# Patient Record
Sex: FEMALE | Race: WHITE | NOT HISPANIC OR LATINO | Employment: OTHER | URBAN - METROPOLITAN AREA
[De-identification: names, ages, dates, MRNs, and addresses within clinical notes are randomized per-mention and may not be internally consistent; named-entity substitution may affect disease eponyms.]

---

## 2023-08-08 ENCOUNTER — LAB REQUISITION (OUTPATIENT)
Dept: LAB | Facility: HOSPITAL | Age: 28
DRG: 857 | End: 2023-08-08
Payer: COMMERCIAL

## 2023-08-08 ENCOUNTER — HOSPITAL ENCOUNTER (INPATIENT)
Facility: HOSPITAL | Age: 28
LOS: 2 days | Discharge: HOME | DRG: 857 | End: 2023-08-10
Attending: SURGERY | Admitting: SURGERY
Payer: COMMERCIAL

## 2023-08-08 ENCOUNTER — OFFICE VISIT (OUTPATIENT)
Dept: WOUND CARE | Facility: HOSPITAL | Age: 28
DRG: 857 | End: 2023-08-08
Payer: COMMERCIAL

## 2023-08-08 DIAGNOSIS — S31.109A OPEN WOUND OF ABDOMEN, INITIAL ENCOUNTER: ICD-10-CM

## 2023-08-08 DIAGNOSIS — T81.31XA DISRUPTION OF EXTERNAL OPERATION (SURGICAL) WOUND, NOT ELSEWHERE CLASSIFIED, INITIAL ENCOUNTER: ICD-10-CM

## 2023-08-08 DIAGNOSIS — L03.311 CELLULITIS OF ABDOMINAL WALL: Primary | ICD-10-CM

## 2023-08-08 LAB
ANION GAP SERPL CALCULATED.3IONS-SCNC: 12.9 MMOL/L (ref 5–15)
BUN SERPL-MCNC: 8 MG/DL (ref 6–20)
BUN/CREAT SERPL: 13.8 (ref 7–25)
CALCIUM SPEC-SCNC: 8.9 MG/DL (ref 8.6–10.5)
CHLORIDE SERPL-SCNC: 101 MMOL/L (ref 98–107)
CO2 SERPL-SCNC: 25.1 MMOL/L (ref 22–29)
CREAT SERPL-MCNC: 0.58 MG/DL (ref 0.57–1)
D-LACTATE SERPL-SCNC: 0.7 MMOL/L (ref 0.5–2)
DEPRECATED RDW RBC AUTO: 37.5 FL (ref 37–54)
EGFRCR SERPLBLD CKD-EPI 2021: 125.8 ML/MIN/1.73
ERYTHROCYTE [DISTWIDTH] IN BLOOD BY AUTOMATED COUNT: 12.5 % (ref 12.3–15.4)
GLUCOSE SERPL-MCNC: 83 MG/DL (ref 65–99)
HCT VFR BLD AUTO: 27.7 % (ref 34–46.6)
HGB BLD-MCNC: 8.9 G/DL (ref 12–15.9)
MCH RBC QN AUTO: 26.9 PG (ref 26.6–33)
MCHC RBC AUTO-ENTMCNC: 32.1 G/DL (ref 31.5–35.7)
MCV RBC AUTO: 83.7 FL (ref 79–97)
PLATELET # BLD AUTO: 371 10*3/MM3 (ref 140–450)
PMV BLD AUTO: 9.2 FL (ref 6–12)
POTASSIUM SERPL-SCNC: 3.7 MMOL/L (ref 3.5–5.2)
RBC # BLD AUTO: 3.31 10*6/MM3 (ref 3.77–5.28)
SODIUM SERPL-SCNC: 139 MMOL/L (ref 136–145)
WBC NRBC COR # BLD: 6.42 10*3/MM3 (ref 3.4–10.8)

## 2023-08-08 PROCEDURE — 80048 BASIC METABOLIC PNL TOTAL CA: CPT | Performed by: SURGERY

## 2023-08-08 PROCEDURE — 87205 SMEAR GRAM STAIN: CPT | Performed by: SURGERY

## 2023-08-08 PROCEDURE — 0JB80ZZ EXCISION OF ABDOMEN SUBCUTANEOUS TISSUE AND FASCIA, OPEN APPROACH: ICD-10-PCS | Performed by: SURGERY

## 2023-08-08 PROCEDURE — 87077 CULTURE AEROBIC IDENTIFY: CPT | Performed by: SURGERY

## 2023-08-08 PROCEDURE — 85027 COMPLETE CBC AUTOMATED: CPT | Performed by: SURGERY

## 2023-08-08 PROCEDURE — 83605 ASSAY OF LACTIC ACID: CPT | Performed by: SURGERY

## 2023-08-08 PROCEDURE — 87186 SC STD MICRODIL/AGAR DIL: CPT | Performed by: SURGERY

## 2023-08-08 PROCEDURE — 87075 CULTR BACTERIA EXCEPT BLOOD: CPT | Performed by: SURGERY

## 2023-08-08 PROCEDURE — 87070 CULTURE OTHR SPECIMN AEROBIC: CPT | Performed by: SURGERY

## 2023-08-08 PROCEDURE — 25010000002 AMPICILLIN-SULBACTAM PER 1.5 G: Performed by: SURGERY

## 2023-08-08 PROCEDURE — 87076 CULTURE ANAEROBE IDENT EACH: CPT | Performed by: SURGERY

## 2023-08-08 PROCEDURE — G0463 HOSPITAL OUTPT CLINIC VISIT: HCPCS

## 2023-08-08 RX ORDER — ONDANSETRON 4 MG/1
8 TABLET, FILM COATED ORAL EVERY 8 HOURS PRN
Status: DISCONTINUED | OUTPATIENT
Start: 2023-08-08 | End: 2023-08-10 | Stop reason: HOSPADM

## 2023-08-08 RX ORDER — VILAZODONE HYDROCHLORIDE 10 MG/1
10 TABLET ORAL DAILY
Status: DISCONTINUED | OUTPATIENT
Start: 2023-08-08 | End: 2023-08-10 | Stop reason: HOSPADM

## 2023-08-08 RX ORDER — ACETAMINOPHEN 325 MG/1
650 TABLET ORAL EVERY 6 HOURS PRN
Status: DISCONTINUED | OUTPATIENT
Start: 2023-08-08 | End: 2023-08-10 | Stop reason: HOSPADM

## 2023-08-08 RX ORDER — HYDROCHLOROTHIAZIDE 25 MG/1
25 TABLET ORAL DAILY
Status: DISCONTINUED | OUTPATIENT
Start: 2023-08-08 | End: 2023-08-10 | Stop reason: HOSPADM

## 2023-08-08 RX ORDER — CLONIDINE HYDROCHLORIDE 0.1 MG/1
0.1 TABLET ORAL NIGHTLY
Status: DISCONTINUED | OUTPATIENT
Start: 2023-08-08 | End: 2023-08-10 | Stop reason: HOSPADM

## 2023-08-08 RX ORDER — HYDROCODONE BITARTRATE AND ACETAMINOPHEN 5; 325 MG/1; MG/1
2 TABLET ORAL EVERY 6 HOURS PRN
Status: DISCONTINUED | OUTPATIENT
Start: 2023-08-08 | End: 2023-08-08

## 2023-08-08 RX ORDER — HYDROCODONE BITARTRATE AND ACETAMINOPHEN 7.5; 325 MG/1; MG/1
2 TABLET ORAL EVERY 4 HOURS PRN
Status: DISCONTINUED | OUTPATIENT
Start: 2023-08-08 | End: 2023-08-08

## 2023-08-08 RX ORDER — DIPHENHYDRAMINE HCL 25 MG
25 CAPSULE ORAL EVERY 6 HOURS PRN
Status: DISCONTINUED | OUTPATIENT
Start: 2023-08-08 | End: 2023-08-10 | Stop reason: HOSPADM

## 2023-08-08 RX ORDER — CLONIDINE HYDROCHLORIDE 0.1 MG/1
0.1 TABLET ORAL NIGHTLY
COMMUNITY

## 2023-08-08 RX ORDER — HYDROXYZINE 50 MG/1
100 TABLET, FILM COATED ORAL NIGHTLY
Status: DISCONTINUED | OUTPATIENT
Start: 2023-08-08 | End: 2023-08-10 | Stop reason: HOSPADM

## 2023-08-08 RX ORDER — HYDROCHLOROTHIAZIDE 12.5 MG/1
25 TABLET ORAL DAILY
COMMUNITY

## 2023-08-08 RX ORDER — HYDROCODONE BITARTRATE AND ACETAMINOPHEN 7.5; 325 MG/1; MG/1
2 TABLET ORAL EVERY 4 HOURS PRN
Status: DISCONTINUED | OUTPATIENT
Start: 2023-08-08 | End: 2023-08-10 | Stop reason: HOSPADM

## 2023-08-08 RX ORDER — DOCUSATE SODIUM 100 MG/1
100 CAPSULE, LIQUID FILLED ORAL 2 TIMES DAILY
Status: DISCONTINUED | OUTPATIENT
Start: 2023-08-08 | End: 2023-08-10 | Stop reason: HOSPADM

## 2023-08-08 RX ORDER — VILAZODONE HYDROCHLORIDE 10 MG/1
10 TABLET ORAL DAILY
COMMUNITY

## 2023-08-08 RX ORDER — HYDROCODONE BITARTRATE AND ACETAMINOPHEN 7.5; 325 MG/1; MG/1
1 TABLET ORAL EVERY 4 HOURS PRN
Status: DISCONTINUED | OUTPATIENT
Start: 2023-08-08 | End: 2023-08-08

## 2023-08-08 RX ORDER — HYDROXYZINE 50 MG/1
100 TABLET, FILM COATED ORAL NIGHTLY
COMMUNITY

## 2023-08-08 RX ORDER — HYDROCODONE BITARTRATE AND ACETAMINOPHEN 7.5; 325 MG/1; MG/1
1 TABLET ORAL EVERY 4 HOURS PRN
Status: DISCONTINUED | OUTPATIENT
Start: 2023-08-08 | End: 2023-08-10 | Stop reason: HOSPADM

## 2023-08-08 RX ADMIN — AMPICILLIN SODIUM AND SULBACTAM SODIUM 3 G: 2; 1 INJECTION, POWDER, FOR SOLUTION INTRAMUSCULAR; INTRAVENOUS at 21:10

## 2023-08-08 RX ADMIN — VILAZODONE HYDROCHLORIDE 10 MG: 10 TABLET ORAL at 14:10

## 2023-08-08 RX ADMIN — DOCUSATE SODIUM 100 MG: 100 CAPSULE, LIQUID FILLED ORAL at 21:17

## 2023-08-08 RX ADMIN — HYDROXYZINE HYDROCHLORIDE 100 MG: 50 TABLET ORAL at 21:10

## 2023-08-08 RX ADMIN — CLONIDINE HYDROCHLORIDE 0.1 MG: 0.1 TABLET ORAL at 21:10

## 2023-08-08 RX ADMIN — HYDROCODONE BITARTRATE AND ACETAMINOPHEN 2 TABLET: 5; 325 TABLET ORAL at 18:05

## 2023-08-08 RX ADMIN — HYDROCODONE BITARTRATE AND ACETAMINOPHEN 2 TABLET: 5; 325 TABLET ORAL at 12:02

## 2023-08-08 RX ADMIN — AMPICILLIN SODIUM AND SULBACTAM SODIUM 3 G: 2; 1 INJECTION, POWDER, FOR SOLUTION INTRAMUSCULAR; INTRAVENOUS at 14:09

## 2023-08-08 RX ADMIN — HYDROCODONE BITARTRATE AND ACETAMINOPHEN 2 TABLET: 7.5; 325 TABLET ORAL at 22:11

## 2023-08-08 RX ADMIN — METOPROLOL TARTRATE 12.5 MG: 25 TABLET, FILM COATED ORAL at 14:09

## 2023-08-08 RX ADMIN — HYDROCHLOROTHIAZIDE 25 MG: 25 TABLET ORAL at 14:09

## 2023-08-08 NOTE — NURSING NOTE
08/08/23 1324   Wound 08/08/23 1056 abdomen Incision   Placement Date/Time: 08/08/23 1056   Present on Hospital Admission: Yes  Location: abdomen  Primary Wound Type: Incision   Wound Image    Base non-granulating;necrotic;slough;yellow;pink  (black/ gray, with tunnels in the areas of necrosis)   Black (%), Wound Tissue Color 85   Red (%), Wound Tissue Color 15   Periwound intact;redness   Periwound Temperature warm   Wound Length (cm) 10.5 cm   Wound Width (cm) 6 cm   Wound Depth (cm) 2 cm   Wound Surface Area (cm^2) 63 cm^2   Wound Volume (cm^3) 126 cm^3   Tunneling [Depth (cm)/Location] 2 tunnels in lower portion of wound, approximately 3cms each   Undermining [Depth (cm)/Location] superior wound connects with mid abdominal wound underneath a skin bridge along incision   Drainage Characteristics/Odor brown;yellow;malodorous   Drainage Amount small   Care, Wound cleansed with;sterile normal saline;negative pressure wound therapy   Dressing Care dressing changed   Periwound Care barrier film applied  (edges were prepared and bordered with hydrocolloid by MD at the [facility] prior to transfering to in-patient care)   NPWT (Negative Pressure Wound Therapy) 08/08/23 1320 abdomen   Placement Date/Time: 08/08/23 1320   Location: abdomen   Therapy Setting continuous therapy   Dressing foam, black;transparent dressing   Pressure Setting 125 mmHg   Sponges Inserted 1     CWON note:  Pt seen for wound VAC placement. Wound assessment as stated above. I called Dr Arzate to clarify the orders and how he wanted a wound VAC dressing placed.  Dressing was placed precisely as he ordered, as an incisional VAC.  Pt tolerated dressing change well. Will plan to change dressing again on Thursday or Friday depending on Dr Arzate and pending home VAC approval for D/C.

## 2023-08-08 NOTE — H&P
Chief Complaint  Information obtained from Patient  28 year old with abdominal wound.    History of Present Illness (HPI)  28 year old female with history of massive weight loss in past, s/p abdominal lipectomy with open wound. The patient has an open wound over the umbilical region after previous lipectomy. The patient is performing Dakins dressing care to the open wound, she was on Clindamycin recently and as finished this. The patient had a temperature last night to 101.5, she is taking Tylenol to help with this. She has weeping from the central aspect of the op site. She has 2 LUZMA drains intact and does complain of pain, she is on Norco pain medication 5/325 mg daily.    Patient History  Information obtained from Patient.    Allergies  Betadine      Social History  Never smoker, Marital Status - , Alcohol Use - Never, Drug Use - No History, Caffeine Use - Daily - tea, soda.    Medical History  Cardiovascular  Patient has history of Hypertension    Hospitalization/Surgery History - tummy tack. - tarsal foot surgery. - carpal tunnel right hand. - 3 c section. - hysterectomy.    Review of Systems (ROS)  Constitutional Symptoms (General Health)  Complains or has symptoms of Fever, Chills.  Eyes  Complains or has symptoms of Glasses / Contacts.  Denies complaints or symptoms of Dry Eyes, Vision Changes.  Ear/Nose/Mouth/Throat  Denies complaints or symptoms of Chronic sinus problems or rhinitis.  Respiratory  Denies complaints or symptoms of Chronic or frequent coughs, Shortness of Breath.  Cardiovascular  Denies complaints or symptoms of Chest pain.  Gastrointestinal  Denies complaints or symptoms of Frequent diarrhea, Nausea, Vomiting.  Endocrine  Denies complaints or symptoms of Heat/cold intolerance.  Genitourinary  Denies complaints or symptoms of Frequent urination.  Integumentary (Skin)  Complains or has symptoms of Wounds.  Musculoskeletal  Denies complaints or symptoms of Muscle Pain, Muscle  Weakness.  Neurologic  Denies complaints or symptoms of Numbness/parasthesias.  Psychiatric  Denies complaints or symptoms of Claustrophobia, Suicidal.      Medications  clonidine HCl 0.1 mg tablet oral tablet oral  metoprolol tartrate 25 mg tablet oral 12.5 tablet oral  Viibryd 10 mg tablet oral tablet oral  hydrochlorothiazide 25 mg tablet oral tablet oral      Objective    Constitutional  Vitals Time Taken: 8:18 AM, Height: 61 in, Source: Stated, Weight: 148 lbs, Source: Measured, BMI: 28, Temperature: 98 øF, Pulse: 92 bpm, Respiratory Rate: 16 breaths/min, Blood Pressure: 109/73 mmHg.      Ears, Nose, Mouth, and Throat  normal hearing at 5 feet forced whisper.    Respiratory  normal breathing without difficulty. clear to auscultation bilaterally.    Cardiovascular  regular rate and rhythm, normal S1, S2,. no clubbing, cyanosis, edema,  Gastrointestinal (GI)  soft, non-distended, +BS, tender over the umbilicil area.    Neurological  cranial nerves 2-12 intact.    Psychiatric  this patient is able to make decisions and demonstrates good insight into disease process. Alert and Oriented x 4. good recall to recent and remote information.      Integumentary (Hair, Skin)  Abdominal area: slough and fibrin over the umbilical region, with dehiscence of the umbilicus inferior border. T Zone has region of early separation and dehiscence at the base.. For complete descriptions of all wounds, see following section on detailed wound assessments..    Wound #1 status is Open. The date acquired was: 8/8/2023. The wound is currently classified as a Full Thickness Without Exposed Support Structures wound with etiology of Open Surgical Wound and is located on the Abdomen - midline. The wound measures 2.8cm length x 2.4cm width x 2.5cm depth; 5.278cm^2 area and 13.195cm^3 volume. There is Fat Layer (Subcutaneous Tissue) exposed. There is no tunneling or undermining noted. There is a medium amount of serosanguineous drainage noted.  Foul odor after cleansing was noted. The wound margin is distinct with the outline attached to the wound base. There is no granulation within the wound bed. There is a large (%) amount of necrotic tissue within the wound bed including Adherent Slough. The periwound skin appearance had no abnormalities noted for texture. The periwound skin appearance had no abnormalities noted for moisture. The periwound skin appearance exhibited: Erythema. The surrounding wound skin color is noted with erythema which is circumferential. Periwound temperature was noted as No Abnormality. The periwound has tenderness on palpation.    Wound #2 status is Open. The date acquired was: 8/8/2023. The wound is currently classified as a Full Thickness Without Exposed Support Structures wound with etiology of Open Surgical Wound and is located on the Distal Abdomen - midline. The wound measures 3.1cm length x 27cm width x 0.3cm depth; 65.738cm^2 area and 19.721cm^3 volume. There is Fat Layer (Subcutaneous Tissue) exposed. There is no tunneling or undermining noted. There is a medium amount of serosanguineous drainage noted. Foul odor after cleansing was noted. The wound margin is distinct with the outline attached to the wound base. There is no granulation within the wound bed. There is a large (%) amount of necrotic tissue within the wound bed including Adherent Slough. The periwound skin appearance had no abnormalities noted for texture. The periwound skin appearance had no abnormalities noted for moisture. The periwound skin appearance exhibited: Erythema. The surrounding wound skin color is noted with erythema which is circumferential. Periwound temperature was noted as No Abnormality. The periwound has tenderness on palpation.        I understand as a provider that the creation of this Progress Note is my responsibility. Based on my medical observations while examining this patient, I certify that these notes are my notes  defining the medical decision making process I utilized during this visit including the etiology, grade or staging of each of the wounds.    Submitted: 8/8/2023 11:03    By: Rustam Arzate    Assessment      The patient has 2 open wounds over the lower abdominal area and the umbilicus with cellulitis, recent febrile episode to 101.5 degrees .  Today the umbilical area will be debrided to prepare the region for a wound vacuum which will permit healing over the lower abdominal area.  Will admit for cellulitis and begin IV Unasyn , today cultures were obtained from the umbilical area.  Dakin's moist gauze was placed over the lower abdominal area until a vacuum dressing can be placed.     Procedures    Wound #1  Pre-procedure diagnosis of Wound #1 is an Open Surgical Wound located on the Abdomen - midline . There was a Excisional Skin/Subcutaneous Tissue Debridement with a total area of 6.72 sq cm performed by Rustam Arzate MD. With the following instrument(s): Curette to remove Viable and Non-Viable tissue/material. Material removed includes Subcutaneous Tissue and Slough and. A time out was conducted at 09:49, prior to the start of the procedure. There was no bleeding. The procedure was tolerated well. Post Debridement Measurements: 2.8cm length x 2.4cm width x 2.5cm depth; 13.195cm^3 volume.  Character of Wound/Ulcer Post Debridement is improved.  Post procedure Diagnosis Wound #1: Same as Pre-Procedure      Wound #2  Pre-procedure diagnosis of Wound #2 is an Open Surgical Wound located on the Distal Abdomen - midline . There was a Excisional Skin/Subcutaneous Tissue Debridement with a total area of 83.7 sq cm performed by Rustam Arzate MD. With the following instrument(s): Curette to remove Viable and Non-Viable tissue/material. Material removed includes Subcutaneous Tissue and Slough and. A time out was conducted at 09:49, prior to the start of the procedure. There was no bleeding. The procedure was tolerated  well. Post Debridement Measurements: 3.1cm length x 27cm width x 0.3cm depth; 19.721cm^3 volume.  Character of Wound/Ulcer Post Debridement is improved.  Post procedure Diagnosis Wound #2: Same as Pre-Procedure          Electronic Signature(s)  Signed: 8/8/2023 11:18:30 AM By: Rustam Arzate MD  Previous Signature: 8/8/2023 11:03:59 AM Version By: Rustam Arzate MD

## 2023-08-09 PROCEDURE — 63710000001 ONDANSETRON PER 8 MG: Performed by: SURGERY

## 2023-08-09 PROCEDURE — 25010000002 AMPICILLIN-SULBACTAM PER 1.5 G: Performed by: SURGERY

## 2023-08-09 RX ADMIN — VILAZODONE HYDROCHLORIDE 10 MG: 10 TABLET ORAL at 08:39

## 2023-08-09 RX ADMIN — DOCUSATE SODIUM 100 MG: 100 CAPSULE, LIQUID FILLED ORAL at 08:39

## 2023-08-09 RX ADMIN — AMPICILLIN SODIUM AND SULBACTAM SODIUM 3 G: 2; 1 INJECTION, POWDER, FOR SOLUTION INTRAMUSCULAR; INTRAVENOUS at 08:39

## 2023-08-09 RX ADMIN — AMPICILLIN SODIUM AND SULBACTAM SODIUM 3 G: 2; 1 INJECTION, POWDER, FOR SOLUTION INTRAMUSCULAR; INTRAVENOUS at 01:59

## 2023-08-09 RX ADMIN — HYDROCODONE BITARTRATE AND ACETAMINOPHEN 1 TABLET: 7.5; 325 TABLET ORAL at 10:00

## 2023-08-09 RX ADMIN — METOPROLOL TARTRATE 12.5 MG: 25 TABLET, FILM COATED ORAL at 08:39

## 2023-08-09 RX ADMIN — ONDANSETRON HYDROCHLORIDE 8 MG: 4 TABLET, FILM COATED ORAL at 10:00

## 2023-08-09 RX ADMIN — HYDROCODONE BITARTRATE AND ACETAMINOPHEN 2 TABLET: 7.5; 325 TABLET ORAL at 18:37

## 2023-08-09 RX ADMIN — HYDROCODONE BITARTRATE AND ACETAMINOPHEN 2 TABLET: 7.5; 325 TABLET ORAL at 05:58

## 2023-08-09 RX ADMIN — CLONIDINE HYDROCHLORIDE 0.1 MG: 0.1 TABLET ORAL at 20:06

## 2023-08-09 RX ADMIN — AMPICILLIN SODIUM AND SULBACTAM SODIUM 3 G: 2; 1 INJECTION, POWDER, FOR SOLUTION INTRAMUSCULAR; INTRAVENOUS at 13:39

## 2023-08-09 RX ADMIN — HYDROCHLOROTHIAZIDE 25 MG: 25 TABLET ORAL at 08:39

## 2023-08-09 RX ADMIN — HYDROXYZINE HYDROCHLORIDE 100 MG: 50 TABLET ORAL at 20:06

## 2023-08-09 RX ADMIN — AMPICILLIN SODIUM AND SULBACTAM SODIUM 3 G: 2; 1 INJECTION, POWDER, FOR SOLUTION INTRAMUSCULAR; INTRAVENOUS at 20:06

## 2023-08-09 RX ADMIN — DOCUSATE SODIUM 100 MG: 100 CAPSULE, LIQUID FILLED ORAL at 20:06

## 2023-08-09 RX ADMIN — HYDROCODONE BITARTRATE AND ACETAMINOPHEN 2 TABLET: 7.5; 325 TABLET ORAL at 13:39

## 2023-08-09 NOTE — CASE MANAGEMENT/SOCIAL WORK
Discharge Planning Assessment  Psychiatric     Patient Name: Palmira Ruelas  MRN: [id number]  Today's Date: 8/9/2023    Admit Date: 8/8/2023    Plan: Home with [facility] wound vac and HH- referral pending   Discharge Needs Assessment       Row Name 08/09/23 1309       Living Environment    People in Home spouse    Name(s) of People in Home Spouse and children    Current Living Arrangements home    Potentially Unsafe Housing Conditions none    Primary Care Provided by self    Provides Primary Care For no one    Family Caregiver if Needed spouse    Family Caregiver Names Terrell Cullen [phone]    Quality of Family Relationships helpful;involved;supportive    Able to Return to Prior Arrangements yes       Resource/Environmental Concerns    Resource/Environmental Concerns none       Transition Planning    Patient/Family Anticipates Transition to home with family    Transportation Anticipated family or friend will provide       Discharge Needs Assessment    Equipment Currently Used at Home none    Concerns to be Addressed discharge planning                   Discharge Plan       Row Name 08/09/23 8653       Plan    Plan Home with [facility] wound vac and HH- referral pending    Patient/Family in Agreement with Plan yes    Plan Comments CCP spoke with patient and patient's spouse, Terrell, at bedside; CCP role explained, face sheet verified, and discharge plan discussed. Patient resides with spouse and three children in one-level home with three-four steps to enter. Patient denies use of any DME and reports being independent with ADLs. Confirms Meds to Beds. Patient states she does have PCP and sees Lakeshia Mcmahon through [facility]. Denies any HH and SNF history. Patient needing wound vac and HH. Agreeable to referrals sent to [facility] (pending). Patient states family will transport home. CCP to follow. Jose ROBLERO LCSW                  Continued Care and Services - Admitted Since 8/8/2023        Durable Medical Equipment       Service Provider Request Status Selected Services Address Phone Fax Patient Preferred    [facility] Accepted N/A [id number] [id number] [phone] --              Home Medical Care       Service Provider Request Status Selected Services Address Phone Fax Patient Preferred    [facility] Pending - Request Sent N/A [address] [facility] [id number] [phone] --                  Expected Discharge Date and Time       Expected Discharge Date Expected Discharge Time    Aug 11, 2023            Demographic Summary       Row Name 08/09/23 1308       General Information    Admission Type inpatient    Arrived From home    Reason for Consult discharge planning    Preferred Language English                   Functional Status       Row Name 08/09/23 1308       Functional Status    Usual Activity Tolerance good    Current Activity Tolerance good       Functional Status, IADL    Medications independent    Meal Preparation independent    Housekeeping independent    Laundry independent    Shopping independent       Mental Status    General Appearance WDL WDL                   Psychosocial    No documentation.                  Abuse/Neglect    No documentation.                  Legal    No documentation.                  Substance Abuse    No documentation.                  Patient Forms    No documentation.                     Jose ROBLERO LCSW

## 2023-08-09 NOTE — PLAN OF CARE
Goal Outcome Evaluation:           Progress: no change  Outcome Evaluation: VSS, c/o pain PRN give, wound vac in place, + ambulation, + BM, IV abx, LUZMA drains in place

## 2023-08-09 NOTE — PLAN OF CARE
Goal Outcome Evaluation:  Plan of Care Reviewed With: patient        Progress: no change  Outcome Evaluation: Patient is currently undergoing treatment for cellulitis of the abdominal wall following a recent abdominal lipectomy. Wound vac in place to abdomen and CDI. LUZMA drains to the right and left lower abdomen with dark red output. Wound cultures pending. IV unasym infusions continued. Slept well tonight.

## 2023-08-09 NOTE — DISCHARGE PLACEMENT REQUEST
"Palmira Moreno (28 y.o. Female)       Date of Birth   1995    Social Security Number       Address   [address] [address]    Home Phone   [phone]    MRN   [id number]       Restoration   Unknown    Marital Status                               Admission Date   8/8/23    Admission Type   Urgent    Admitting Provider   Silas Arzate MD    Attending Provider   Silas Arzate MD    Department, Room/Bed   [address], [address]/1       Discharge Date       Discharge Disposition       Discharge Destination                                 Attending Provider: Silas Arzate MD    Allergies: Betadine [Povidone Iodine], Latex    Isolation: None   Infection: None   Code Status: CPR    Ht: 154.9 cm (61\")   Wt: 70 kg (154 lb 5.2 oz)    Admission Cmt: None   Principal Problem: Cellulitis of abdominal wall [L03.311]                   Active Insurance as of 8/8/2023       Primary Coverage       Payor Plan Insurance Group Employer/Plan Group    [employer] [facility] [id number]       Payor Plan Address Payor Plan Phone Number Payor Plan Fax Number Effective Dates    [address] [phone]  10/21/2020 - None Entered    [place] [address]         Subscriber Name Subscriber Birth Date Member ID       NICOLASA MORENO 1995 [id number]                     Emergency Contacts        (Rel.) Home Phone Work Phone Mobile Phone    NICOLASA Moreno (Spouse) [phone] -- [phone]                "

## 2023-08-09 NOTE — PAYOR COMM NOTE
"Palmira Moreno (28 y.o. Female)          INPATIENT REQUEST FOR MEMBER [id number]    CONTACT PHOEBE ROMANO  P# [phone]  F# [phone]         Date of Birth   1995    Social Security Number       Address   [address] [place] [address]    Home Phone   [phone]    MRN   [id number]       Mu-ism   Unknown    Marital Status                               Admission Date   8/8/23    Admission Type   Urgent    Admitting Provider   Silas Arzate MD    Attending Provider   Silas Arzate MD    Department, Room/Bed   [address], E467/1       Discharge Date       Discharge Disposition       Discharge Destination                                 Attending Provider: Silas Arzate MD    Allergies: Betadine [Povidone Iodine], Latex    Isolation: None   Infection: None   Code Status: CPR    Ht: 154.9 cm (61\")   Wt: 70 kg (154 lb 5.2 oz)    Admission Cmt: None   Principal Problem: Cellulitis of abdominal wall [L03.311]                   Active Insurance as of 8/8/2023       Primary Coverage       Payor Plan Insurance Group Employer/Plan Group    [employer] BLUE CROSS [employer] BLUE CROSS BLUE [employer] PPO [id number]       Payor Plan Address Payor Plan Phone Number Payor Plan Fax Number Effective Dates    [address] [phone]  10/21/2020 - None Entered    [place] [address]         Subscriber Name Subscriber Birth Date Member ID       NICOLASA MORENO 1995 [id number]                     Emergency Contacts        (Rel.) Home Phone Work Phone Mobile Phone    NICOLASA Moreno (Spouse) [phone] -- [phone]                 History & Physical        Silas Arzate MD at 08/08/23 1117          Chief Complaint  Information obtained from Patient  28 year old with abdominal wound.    History of Present Illness (HPI)  28 year old female with history of massive weight loss in past, s/p abdominal lipectomy with open wound. The patient has " an open wound over the umbilical region after previous lipectomy. The patient is performing Dakins dressing care to the open wound, she was on Clindamycin recently and as finished this. The patient had a temperature last night to 101.5, she is taking Tylenol to help with this. She has weeping from the central aspect of the op site. She has 2 LUZMA drains intact and does complain of pain, she is on Norco pain medication 5/325 mg daily.    Patient History  Information obtained from Patient.    Allergies  Betadine      Social History  Never smoker, Marital Status - , Alcohol Use - Never, Drug Use - No History, Caffeine Use - Daily - tea, soda.    Medical History  Cardiovascular  Patient has history of Hypertension    Hospitalization/Surgery History - tummy tack. - tarsal foot surgery. - carpal tunnel right hand. - 3 c section. - hysterectomy.    Review of Systems (ROS)  Constitutional Symptoms (General Health)  Complains or has symptoms of Fever, Chills.  Eyes  Complains or has symptoms of Glasses / Contacts.  Denies complaints or symptoms of Dry Eyes, Vision Changes.  Ear/Nose/Mouth/Throat  Denies complaints or symptoms of Chronic sinus problems or rhinitis.  Respiratory  Denies complaints or symptoms of Chronic or frequent coughs, Shortness of Breath.  Cardiovascular  Denies complaints or symptoms of Chest pain.  Gastrointestinal  Denies complaints or symptoms of Frequent diarrhea, Nausea, Vomiting.  Endocrine  Denies complaints or symptoms of Heat/cold intolerance.  Genitourinary  Denies complaints or symptoms of Frequent urination.  Integumentary (Skin)  Complains or has symptoms of Wounds.  Musculoskeletal  Denies complaints or symptoms of Muscle Pain, Muscle Weakness.  Neurologic  Denies complaints or symptoms of Numbness/parasthesias.  Psychiatric  Denies complaints or symptoms of Claustrophobia, Suicidal.      Medications  clonidine HCl 0.1 mg tablet oral tablet oral  metoprolol tartrate 25 mg tablet oral  12.5 tablet oral  Viibryd 10 mg tablet oral tablet oral  hydrochlorothiazide 25 mg tablet oral tablet oral      Objective    Constitutional  Vitals Time Taken: 8:18 AM, Height: 61 in, Source: Stated, Weight: 148 lbs, Source: Measured, BMI: 28, Temperature: 98 øF, Pulse: 92 bpm, Respiratory Rate: 16 breaths/min, Blood Pressure: 109/73 mmHg.      Ears, Nose, Mouth, and Throat  normal hearing at 5 feet forced whisper.    Respiratory  normal breathing without difficulty. clear to auscultation bilaterally.    Cardiovascular  regular rate and rhythm, normal S1, S2,. no clubbing, cyanosis, edema,  Gastrointestinal (GI)  soft, non-distended, +BS, tender over the umbilicil area.    Neurological  cranial nerves 2-12 intact.    Psychiatric  this patient is able to make decisions and demonstrates good insight into disease process. Alert and Oriented x 4. good recall to recent and remote information.      Integumentary (Hair, Skin)  Abdominal area: slough and fibrin over the umbilical region, with dehiscence of the umbilicus inferior border. T Zone has region of early separation and dehiscence at the base.. For complete descriptions of all wounds, see following section on detailed wound assessments..    Wound #1 status is Open. The date acquired was: 8/8/2023. The wound is currently classified as a Full Thickness Without Exposed Support Structures wound with etiology of Open Surgical Wound and is located on the Abdomen - midline. The wound measures 2.8cm length x 2.4cm width x 2.5cm depth; 5.278cm^2 area and 13.195cm^3 volume. There is Fat Layer (Subcutaneous Tissue) exposed. There is no tunneling or undermining noted. There is a medium amount of serosanguineous drainage noted. Foul odor after cleansing was noted. The wound margin is distinct with the outline attached to the wound base. There is no granulation within the wound bed. There is a large (%) amount of necrotic tissue within the wound bed including Adherent  Slough. The periwound skin appearance had no abnormalities noted for texture. The periwound skin appearance had no abnormalities noted for moisture. The periwound skin appearance exhibited: Erythema. The surrounding wound skin color is noted with erythema which is circumferential. Periwound temperature was noted as No Abnormality. The periwound has tenderness on palpation.    Wound #2 status is Open. The date acquired was: 8/8/2023. The wound is currently classified as a Full Thickness Without Exposed Support Structures wound with etiology of Open Surgical Wound and is located on the Distal Abdomen - midline. The wound measures 3.1cm length x 27cm width x 0.3cm depth; 65.738cm^2 area and 19.721cm^3 volume. There is Fat Layer (Subcutaneous Tissue) exposed. There is no tunneling or undermining noted. There is a medium amount of serosanguineous drainage noted. Foul odor after cleansing was noted. The wound margin is distinct with the outline attached to the wound base. There is no granulation within the wound bed. There is a large (%) amount of necrotic tissue within the wound bed including Adherent Slough. The periwound skin appearance had no abnormalities noted for texture. The periwound skin appearance had no abnormalities noted for moisture. The periwound skin appearance exhibited: Erythema. The surrounding wound skin color is noted with erythema which is circumferential. Periwound temperature was noted as No Abnormality. The periwound has tenderness on palpation.        I understand as a provider that the creation of this Progress Note is my responsibility. Based on my medical observations while examining this patient, I certify that these notes are my notes defining the medical decision making process I utilized during this visit including the etiology, grade or staging of each of the wounds.    Submitted: 8/8/2023 11:03    By: Rustam Arzate    Assessment      The patient has 2 open wounds over the lower  abdominal area and the umbilicus with cellulitis, recent febrile episode to 101.5 degrees .  Today the umbilical area will be debrided to prepare the region for a wound vacuum which will permit healing over the lower abdominal area.  Will admit for cellulitis and begin IV Unasyn , today cultures were obtained from the umbilical area.  Dakin's moist gauze was placed over the lower abdominal area until a vacuum dressing can be placed.     Procedures    Wound #1  Pre-procedure diagnosis of Wound #1 is an Open Surgical Wound located on the Abdomen - midline . There was a Excisional Skin/Subcutaneous Tissue Debridement with a total area of 6.72 sq cm performed by Rustam Arzate MD. With the following instrument(s): Curette to remove Viable and Non-Viable tissue/material. Material removed includes Subcutaneous Tissue and Slough and. A time out was conducted at 09:49, prior to the start of the procedure. There was no bleeding. The procedure was tolerated well. Post Debridement Measurements: 2.8cm length x 2.4cm width x 2.5cm depth; 13.195cm^3 volume.  Character of Wound/Ulcer Post Debridement is improved.  Post procedure Diagnosis Wound #1: Same as Pre-Procedure      Wound #2  Pre-procedure diagnosis of Wound #2 is an Open Surgical Wound located on the Distal Abdomen - midline . There was a Excisional Skin/Subcutaneous Tissue Debridement with a total area of 83.7 sq cm performed by Rustam Arzate MD. With the following instrument(s): Curette to remove Viable and Non-Viable tissue/material. Material removed includes Subcutaneous Tissue and Slough and. A time out was conducted at 09:49, prior to the start of the procedure. There was no bleeding. The procedure was tolerated well. Post Debridement Measurements: 3.1cm length x 27cm width x 0.3cm depth; 19.721cm^3 volume.  Character of Wound/Ulcer Post Debridement is improved.  Post procedure Diagnosis Wound #2: Same as Pre-Procedure          Electronic Signature(s)  Signed:  8/8/2023 11:18:30 AM By: Rustam Arzate MD  Previous Signature: 8/8/2023 11:03:59 AM Version By: Rustam Arzate MD    Electronically signed by Silas Arzate MD at 08/08/23 1119         Radha Burnette RN, CWON   Registered Nurse  Wound Care     Nursing Note      Signed     Date of Service: 08/08/23 1324  Creation Time: 08/08/23 1336     Signed              08/08/23 1324   Wound 08/08/23 1056 abdomen Incision   Placement Date/Time: 08/08/23 1056   Present on Hospital Admission: Yes  Location: abdomen  Primary Wound Type: Incision   Wound Image    Base non-granulating;necrotic;slough;yellow;pink  (black/ gray, with tunnels in the areas of necrosis)   Black (%), Wound Tissue Color 85   Red (%), Wound Tissue Color 15   Periwound intact;redness   Periwound Temperature warm   Wound Length (cm) 10.5 cm   Wound Width (cm) 6 cm   Wound Depth (cm) 2 cm   Wound Surface Area (cm^2) 63 cm^2   Wound Volume (cm^3) 126 cm^3   Tunneling [Depth (cm)/Location] 2 tunnels in lower portion of wound, approximately 3cms each   Undermining [Depth (cm)/Location] superior wound connects with mid abdominal wound underneath a skin bridge along incision   Drainage Characteristics/Odor brown;yellow;malodorous   Drainage Amount small   Care, Wound cleansed with;sterile normal saline;negative pressure wound therapy   Dressing Care dressing changed   Periwound Care barrier film applied  (edges were prepared and bordered with hydrocolloid by MD at the [facility] prior to transfering to in-patient care)   NPWT (Negative Pressure Wound Therapy) 08/08/23 1320 abdomen   Placement Date/Time: 08/08/23 1320   Location: abdomen   Therapy Setting continuous therapy   Dressing foam, black;transparent dressing   Pressure Setting 125 mmHg   Sponges Inserted 1      CWON note:  Pt seen for wound VAC placement. Wound assessment as stated above. I called Dr Arzate to clarify the orders and how he wanted a wound VAC dressing placed.  Dressing was placed precisely  as he ordered, as an incisional VAC.  Pt tolerated dressing change well. Will plan to change dressing again on Thursday or Friday depending on Dr Arzate and pending home VAC approval for D/C.               Molly Alvares, RN   Registered Nurse  Nursing     Plan of Care      Signed     Date of Service: 08/09/23 0458  Creation Time: 08/09/23 0458     Signed         Goal Outcome Evaluation:  Plan of Care Reviewed With: patient  Progress: no change  Outcome Evaluation: Patient is currently undergoing treatment for cellulitis of the abdominal wall following a recent abdominal lipectomy. Wound vac in place to abdomen and CDI. LUZMA drains to the right and left lower abdomen with dark red output. Wound cultures pending. IV unasym infusions continued. Slept well tonight.                       All medication doses during the admission are shown, including meds that are no longer on order.  Scheduled Meds Sorted by Name  for Palmira Ruelas as of 8/9/23 through 8/9/23    1 Day 3 Days 7 Days 10 Days < Today >   Legend:       Medications 08/09/23   ampicillin-sulbactam (UNASYN) 3 g in sodium chloride 0.9 % 100 mL IVPB-VTB  Dose: 3 g  Freq: Every 6 Hours Route: IV  Indications of Use: SKIN AND SOFT TISSUE INFECTION  Start: 08/08/23 1215 End: 08/11/23 1359   Admin Instructions:       0159   0800   1400   2000              cloNIDine (CATAPRES) tablet 0.1 mg  Dose: 0.1 mg  Freq: Nightly Route: PO  Start: 08/08/23 2100   Admin Instructions:       2100                 docusate sodium (COLACE) capsule 100 mg  Dose: 100 mg  Freq: 2 Times Daily Route: PO  Start: 08/08/23 2100   Admin Instructions:       0900 2100                hydroCHLOROthiazide (HYDRODIURIL) tablet 25 mg  Dose: 25 mg  Freq: Daily Route: PO  Start: 08/08/23 1215   Admin Instructions:       0900                 hydrOXYzine (ATARAX) tablet 100 mg  Dose: 100 mg  Freq: Nightly Route: PO  Start: 08/08/23 2100   Admin Instructions:       2100                 metoprolol  tartrate (LOPRESSOR) tablet 12.5 mg  Dose: 12.5 mg  Freq: Daily Route: PO  Start: 08/08/23 1215   Admin Instructions:       0900                 vilazodone (VIIBRYD) tablet 10 mg  Dose: 10 mg  Freq: Daily Route: PO  Start: 08/08/23 1215   Admin Instructions:       0900                 Medications 08/09/23         Continuous Meds Sorted by Name  for Palmira Ruelas as of 8/9/23 through 8/9/23  Legend:       Medications 08/09/23         PRN Meds Sorted by Name  for Palmira Ruelas A as of 8/9/23 through 8/9/23  Legend:       Medications 08/09/23   acetaminophen (TYLENOL) tablet 650 mg  Dose: 650 mg  Freq: Every 6 Hours PRN Route: PO  PRN Reason: Mild Pain  Start: 08/08/23 1122   Admin Instructions:          diphenhydrAMINE (BENADRYL) capsule 25 mg  Dose: 25 mg  Freq: Every 6 Hours PRN Route: PO  PRN Reasons: Sleep,Allergies,Itching  Start: 08/08/23 1123   Admin Instructions:          HYDROcodone-acetaminophen (NORCO) 5-325 MG per tablet 2 tablet  Dose: 2 tablet  Freq: Every 6 Hours PRN Route: PO  PRN Reasons: Moderate Pain,Severe Pain  Start: 08/08/23 1122 End: 08/08/23 2138   Admin Instructions:           HYDROcodone-acetaminophen (NORCO) 7.5-325 MG per tablet 1 tablet  Dose: 1 tablet  Freq: Every 4 Hours PRN Route: PO  PRN Reason: Moderate Pain  Start: 08/08/23 2202 End: 08/15/23 2201   Admin Instructions:                        Or  HYDROcodone-acetaminophen (NORCO) 7.5-325 MG per tablet 2 tablet  Dose: 2 tablet  Freq: Every 4 Hours PRN Route: PO  PRN Reason: Moderate Pain  Start: 08/08/23 2202 End: 08/15/23 2201   Admin Instructions:       0558                 HYDROcodone-acetaminophen (NORCO) 7.5-325 MG per tablet 1 tablet  Dose: 1 tablet  Freq: Every 4 Hours PRN Route: PO  PRN Reason: Moderate Pain  Start: 08/08/23 2136 End: 08/08/23 2204   Admin Instructions:          HYDROcodone-acetaminophen (NORCO) 7.5-325 MG per tablet 2 tablet  Dose: 2 tablet  Freq: Every 4 Hours PRN Route: PO  PRN Reason: Moderate  Pain  Start: 08/08/23 2137 End: 08/08/23 2204   Admin Instructions:          ondansetron (ZOFRAN) tablet 8 mg  Dose: 8 mg  Freq: Every 8 Hours PRN Route: PO  PRN Reasons: Nausea,Vomiting  Start: 08/08/23 1122   Admin Instructions:          Medications 08/09/23

## 2023-08-09 NOTE — PROGRESS NOTES
[facility]     Progress Note    Patient Name: Palmira Ruelas  : 1995  MRN: [id number]  Primary Care Physician:  No primary care provider on file.  Date of admission: 2023    Subjective   Subjective     Chief Complaint: Right LUZMA drain discomfort and some discomfort over the midline.     History of Present Illness  28 year old female s/p abdominal lipectomy with open wound, s/p debridement with vacuum placement.   The vacuum is in tact and working well.   Patient Reports She is tolerating her oral intact.     Review of Systems    Objective   Objective     Vitals:   Temp:  [98.4 øF (36.9 øC)-99.1 øF (37.3 øC)] 99 øF (37.2 øC)  Heart Rate:  [82-95] 92  Resp:  [16] 16  BP: ()/(66-78) 96/66    Physical Exam  Vitals reviewed.   Constitutional:       Appearance: Normal appearance.   HENT:      Head: Normocephalic and atraumatic.      Mouth/Throat:      Mouth: Mucous membranes are moist.   Eyes:      Pupils: Pupils are equal, round, and reactive to light.   Cardiovascular:      Rate and Rhythm: Normal rate and regular rhythm.   Pulmonary:      Effort: Pulmonary effort is normal.   Abdominal:      General: Abdomen is flat.      Palpations: Abdomen is soft.   Skin:     General: Skin is warm and dry.      Comments: Vacuum intact, some blood in the canister .      Right and left LUZMA bulb is intact with bloody output.     Abdominal skin op site with well opposed skin edges, no sign of any purulence, edema is less.      Tender over the LUZMA exit sites.        Neurological:      General: No focal deficit present.      Mental Status: She is alert.   Psychiatric:         Mood and Affect: Mood normal.         Behavior: Behavior normal.         Thought Content: Thought content normal.        Result Review    Result Review:  I have personally reviewed the results from the time of this admission to 2023 16:26 EDT and agree with these findings:  [x]  Laboratory list / accordion  [x]  Microbiology  []   Radiology  []  EKG/Telemetry   []  Cardiology/Vascular   []  Pathology  []  Old records  []  Other:  Most notable findings include: None      Assessment & Plan   Assessment / Plan     Brief Patient Summary:    Active Hospital Problems:  Active Hospital Problems    Diagnosis     **Cellulitis of abdominal wall      Plan: The patients cultures are pending, she had some bacteria on her early results.  She is on Unasyn and has responded well to this . She is on Norco pain medication and says she needs it for the LUZMA bulb pain and midline pain .  Plan to monitor her response to current therapy, no signs of sepsis or bleeding.       DVT prophylaxis:  Mechanical DVT prophylaxis orders are present.    CODE STATUS:    Level Of Support Discussed With: Patient  Code Status (Patient has no pulse and is not breathing): CPR (Attempt to Resuscitate)  Medical Interventions (Patient has pulse or is breathing): Full Support    Disposition:  I expect patient to be discharged  in 24 hours. .    Silas Arzate MD

## 2023-08-09 NOTE — PROGRESS NOTES
"Nutrition Services    Patient Name:  Palmira Ruelas  YOB: 1995  MRN: [id number]  Admit Date:  8/8/2023  Assessment Date:  08/09/23    Comment: Abdominal wound dehiscence     28yoF with PMH of DM, gastric bypass, s/p abdominal lipectomy with open wound presents to ED with c/o fever and pain around incision. Admitted for abdominal wall cellulitis with wound vac placed yesterday.     Nutrition assessment completed 2/2 abdominal wound with wound vac. Regular diet, thin liquids initiated yesterday per MD. Arnaldo BID ordered. Spoke with pt at bedside and discussed different ways to mix Arnaldo with foods for improved intake. Pt endorses hx of gastric bypass and reports eating small meals q2 hrs. Agreeable to ONS BID with meals for additional nutrition support. Pt with 2 LUZMA drains in R/LLQ. +70 mL out via drains past 24 hrs per I/Os. Last documented BM on 8/7 per I/Os.     Recommendations:   Continue current diet order and encourage adequate PO intake PRN.   Add Boost Plus (lakisha) BID with meals for additional nutrition support.   Arnaldo BID with meals for healing support.     RD will continue to follow course for PO intake and tolerance.     CLINICAL NUTRITION ASSESSMENT      Reason for Assessment Pressure Injury and/or Non-Healing Wound     Diagnosis/Problem   Cellulitis of abdominal wall    Medical/Surgical History History reviewed. No pertinent past medical history.    History reviewed. No pertinent surgical history.     Encounter Information        Nutrition History:  Pt with hx of T2DM, HTN, gastric bypass (2016) and PCOS. Last A1c was 5.4 (6/2023) per chart review. She reports eating small meals q 2 hr and drinking protein shakes throughout the day.    Food Preferences:    Supplements:    Factors Affecting Intake: altered GI function - hx of gastric bypass (2016)     Anthropometrics        Current Height  Current Weight  BMI kg/m2 Height: 154.9 cm (61\")  Weight: 70 kg (154 lb 5.2 oz) (08/08/23 " [facility]   Positive Test Results and Return to Work Information    5/20/2022    Dear Finn Doty,     Employee Health received notice of your positive COVID-19 test on 5-20-22. You will need to stay off work and keep out of public places, except to seek medical care.     According to [employer] [facility]’s new COVID-19 guidelines, and the date of your positive test:     YOUR EXPECTED RETURN TO WORK DATE IS:  5-26-22    The Care Companion on your [employer] dudley has been turned on to track and report your symptoms. Employee Health will follow those symptoms. If symptoms become severe please seek medical attention from your primary care provider or the Emergency Room if needed.    When You Can (not must) Return to Work Criteria (all criteria must be met):  ·  A minimum of 5 days has passed since your symptoms started or positive test date if you do not have symptoms  · Your temperature has remained less than 100.0°F for at least 24 hours without using any fever reducing medications (ibuprofen, acetaminophen, etc.)  · Significant improvement in respiratory symptoms and cough  • No new or worsening symptoms   • If you are free of fever, and do not have a persistent cough, shortness of breath, runny nose, or weakness     Remember this is guidance and we do not encourage sick health care workers to be at work, speak with your leader if you are still ill.     YOU DO NOT NEED TO RETEST TO RETURN TO WORK     If you do not meet the above criteria, you will need to alert your leader per your sick call policy and if your symptoms remain longer than 10 days, you will need to seek care from your primary care provider or Urgent Care for symptom management, and for your return to work.     Absence:   • If you continue to have a medical reason to remain off work, you will need to follow your department’s normal unscheduled absence process or seek care from your primary care provider.   • If your absence needs a leave  1111)  Body mass index is 29.16 kg/mý.   Adjusted BMI (if applicable)    BMI Category Overweight (25 - 29.9)       Admission Weight 154 lb (70 kg)       Ideal Body Weight (IBW) 105 lb (48 kg)   Adjusted IBW (if applicable)        Usual Body Weight (UBW) 183 lb (2/2023)   Weight Change/Trend Loss, Amount/Timeframe: 29 lb (15.8%) weight loss past 6 months, intentional        Weight History Wt Readings from Last 30 Encounters:   08/08/23 1111 70 kg (154 lb 5.2 oz)             Estimated/Assessed Needs        Current Weight  Weight: 70 kg (154 lb 5.2 oz) (08/08/23 1111)       Energy Requirements    Weight for Calculation 70 kg   Method for Estimation  25 kcal/kg, 30 kcal/kg   EST Needs (kcal/day) 0122-7046 kcal       Protein Requirements    Weight for Calculation 70 kg   EST Protein Needs (g/kg) 1.5 gm/kg   EST Daily Needs (g/day) 105 gm       Fluid Requirements     Method for Estimation 1 mL/kcal    EST Needs (mL/day)      Tests/Procedures        Tests/Procedures No new tests/procedures     Labs       Pertinent Labs    Results from last 7 days   Lab Units 08/08/23  1156   SODIUM mmol/L 139   POTASSIUM mmol/L 3.7   CHLORIDE mmol/L 101   CO2 mmol/L 25.1   BUN mg/dL 8   CREATININE mg/dL 0.58   CALCIUM mg/dL 8.9   GLUCOSE mg/dL 83     Results from last 7 days   Lab Units 08/08/23  1156   HEMOGLOBIN g/dL 8.9*   HEMATOCRIT % 27.7*   WBC 10*3/mm3 6.42     Results from last 7 days   Lab Units 08/08/23  1156   PLATELETS 10*3/mm3 371     No results found for: COVID19  No results found for: HGBA1C       Medications           Scheduled Medications ampicillin-sulbactam, 3 g, Intravenous, Q6H  cloNIDine, 0.1 mg, Oral, Nightly  docusate sodium, 100 mg, Oral, BID  hydroCHLOROthiazide, 25 mg, Oral, Daily  hydrOXYzine, 100 mg, Oral, Nightly  metoprolol tartrate, 12.5 mg, Oral, Daily  vilazodone, 10 mg, Oral, Daily       Infusions     PRN Medications   acetaminophen    diphenhydrAMINE    HYDROcodone-acetaminophen **OR**  of absence you will need to apply by calling [facility] (in [place]) at [phone] or [employer] (in [place]) at [phone]. Medical certification by a Primary Care Provider is required to support the need for a medical leave of absence. Employee Health is unable to provide this function. If you fail to provide the required medical certification, the leave will not be approved.    You must continue to adhere to strict use of appropriate personal protective equipment (PPE) while in the workplace.     Employee Health will follow your care companion reported symptoms for 5 days and then discontinue.       Thank you,     [facility]  Email: [email or web address]  Hotline: [phone]    CC: Manager           HYDROcodone-acetaminophen    ondansetron     Physical Findings          Physical Appearance alert, oriented, overweight, room air   Oral/Mouth Cavity WNL   Edema  no edema   Gastrointestinal last bowel movement: 8/7   Skin  non-healing wound(s), pale, surgical incision: abdomen, wound VAC   Tubes/Drains/Lines drain, LUZMA drain x 2 - R/LLQ   NFPE No clinical signs of muscle wasting or fat loss   --  Current Nutrition Orders & Evaluation of Intake       Oral Nutrition     Food Allergies NKFA   Current PO Diet Diet: Regular/House Diet; Texture: Regular Texture (IDDSI 7); Fluid Consistency: Thin (IDDSI 0)   Supplement Arnaldo, BID   PO Evaluation     % PO Intake 50-75% - Drake Bautista's sandwich wrappers present at bedside     # of Days Evaluated 1   --  PES STATEMENT / NUTRITION DIAGNOSIS      Nutrition Dx Problem  Problem: Increased Nutrient Needs  Etiology: Medical Diagnosis and MNT for Treatment/Condition wound healing support   Signs/Symptoms: Report/Observation    Comment:    --  NUTRITION INTERVENTION / PLAN OF CARE      Intervention Goal(s) Nutrition support treatment, Improved nutrition related labs, Meet estimated needs, Disease management/therapy, Tolerate PO , Continue positive trend, Maintain weight, and No significant weight loss         RD Intervention/Action Adjusted supplement, Supplement provided, Encourage intake, Follow Tx Progress, Care plan reviewed, and Recommend/ordered:          Prescription/Orders:       PO Diet       Supplements Boost Plus BID, Arnaldo BID with meals      Snacks       Enteral Nutrition       Parenteral Nutrition    New Prescription Ordered? Yes   --      Monitor/Evaluation Per protocol, I&O, PO intake, Supplement intake, Pertinent labs, Weight, Skin status, GI status, Symptoms   Discharge Plan/Needs Pending clinical course   Education Will instruct as appropriate   --    RD to follow per protocol.      Electronically signed by:  Paula León RD  08/09/23 14:24 EDT

## 2023-08-10 VITALS
HEART RATE: 102 BPM | WEIGHT: 154.32 LBS | BODY MASS INDEX: 29.14 KG/M2 | HEIGHT: 61 IN | TEMPERATURE: 98.8 F | DIASTOLIC BLOOD PRESSURE: 79 MMHG | RESPIRATION RATE: 18 BRPM | SYSTOLIC BLOOD PRESSURE: 119 MMHG | OXYGEN SATURATION: 98 %

## 2023-08-10 PROCEDURE — 25010000002 AMPICILLIN-SULBACTAM PER 1.5 G: Performed by: SURGERY

## 2023-08-10 RX ORDER — DOXYCYCLINE HYCLATE 100 MG/1
100 CAPSULE ORAL 2 TIMES DAILY
Qty: 30 CAPSULE | Refills: 1 | Status: SHIPPED | OUTPATIENT
Start: 2023-08-10

## 2023-08-10 RX ORDER — HYDROCODONE BITARTRATE AND ACETAMINOPHEN 7.5; 325 MG/1; MG/1
1 TABLET ORAL EVERY 6 HOURS PRN
Qty: 20 TABLET | Refills: 0 | Status: SHIPPED | OUTPATIENT
Start: 2023-08-10 | End: 2023-08-15 | Stop reason: SDUPTHER

## 2023-08-10 RX ORDER — DOCUSATE SODIUM 100 MG/1
100 CAPSULE, LIQUID FILLED ORAL 2 TIMES DAILY
Qty: 20 CAPSULE | Refills: 1 | Status: SHIPPED | OUTPATIENT
Start: 2023-08-10

## 2023-08-10 RX ADMIN — AMPICILLIN SODIUM AND SULBACTAM SODIUM 3 G: 2; 1 INJECTION, POWDER, FOR SOLUTION INTRAMUSCULAR; INTRAVENOUS at 01:49

## 2023-08-10 RX ADMIN — HYDROCODONE BITARTRATE AND ACETAMINOPHEN 2 TABLET: 7.5; 325 TABLET ORAL at 00:07

## 2023-08-10 RX ADMIN — HYDROCODONE BITARTRATE AND ACETAMINOPHEN 1 TABLET: 7.5; 325 TABLET ORAL at 11:35

## 2023-08-10 RX ADMIN — AMPICILLIN SODIUM AND SULBACTAM SODIUM 3 G: 2; 1 INJECTION, POWDER, FOR SOLUTION INTRAMUSCULAR; INTRAVENOUS at 09:29

## 2023-08-10 RX ADMIN — METOPROLOL TARTRATE 12.5 MG: 25 TABLET, FILM COATED ORAL at 09:29

## 2023-08-10 RX ADMIN — VILAZODONE HYDROCHLORIDE 10 MG: 10 TABLET ORAL at 09:29

## 2023-08-10 RX ADMIN — HYDROCODONE BITARTRATE AND ACETAMINOPHEN 2 TABLET: 7.5; 325 TABLET ORAL at 07:22

## 2023-08-10 RX ADMIN — HYDROCHLOROTHIAZIDE 25 MG: 25 TABLET ORAL at 09:29

## 2023-08-10 NOTE — PROGRESS NOTES
[facility]     Progress Note    Patient Name: Palmira Ruelas  : 1995  MRN: [id number]  Primary Care Physician:  No primary care provider on file.  Date of admission: 2023    Subjective   Subjective     Chief Complaint: No new issues today.    History of Present Illness  28 year old s/p abdominal lipectomy with wound vacuum over the lower abdominal area. The vacuum is working well with bloody output in the canister.  She is on Norco 7.5/325 mg for her pain control.   Patient Reports She was able to eat breakfast and pain is under good control.     Review of Systems    Objective   Objective     Vitals:   Temp:  [98.8 øF (37.1 øC)-99.5 øF (37.5 øC)] 98.8 øF (37.1 øC)  Heart Rate:  [] 102  Resp:  [16-18] 18  BP: ()/(66-79) 119/79    Physical Exam  Vitals reviewed.   Constitutional:       Appearance: Normal appearance.   HENT:      Head: Normocephalic and atraumatic.      Mouth/Throat:      Mouth: Mucous membranes are moist.   Eyes:      Pupils: Pupils are equal, round, and reactive to light.   Cardiovascular:      Rate and Rhythm: Normal rate.   Pulmonary:      Effort: Pulmonary effort is normal.   Abdominal:      General: Abdomen is flat.      Palpations: Abdomen is soft.   Skin:     General: Skin is warm and dry.      Capillary Refill: Capillary refill takes 2 to 3 seconds.      Comments: Abdominal area: abdominal wound over the lower T zone, the skin is non erythematous with mild fibrin . Edema is minimal .  Umbilical region with floor of the umbilicus viable and small defect over the inferior border.     No warmth to the skin.  No foul odor.    Neurological:      General: No focal deficit present.      Mental Status: She is alert.   Psychiatric:         Mood and Affect: Mood normal.         Behavior: Behavior normal.         Thought Content: Thought content normal.         Judgment: Judgment normal.        Result Review    Result Review:  I have personally reviewed the results from the  time of this admission to 8/10/2023 09:13 EDT and agree with these findings:  [x]  Laboratory list / accordion  [x]  Microbiology  []  Radiology  []  EKG/Telemetry   []  Cardiology/Vascular   []  Pathology  []  Old records  []  Other:  Most notable findings include:       Assessment & Plan   Assessment / Plan     Brief Patient Summary:      Active Hospital Problems:  Active Hospital Problems    Diagnosis     **Cellulitis of abdominal wall      Plan: The patient's cellulitis has resolved on IV Unasyn and she may be discharged home today on oral Doxycycline.  The patient will have a wound vacuum over the abdominal area, this will be changed on Tuesday in the wound care clinic.  The right sided LUZMA bulb was removed due to leaking over the edges of the tubing.  The left sided LUZMA is intact with bloody drainage.       DVT prophylaxis:  Mechanical DVT prophylaxis orders are present.    CODE STATUS:    Level Of Support Discussed With: Patient  Code Status (Patient has no pulse and is not breathing): CPR (Attempt to Resuscitate)  Medical Interventions (Patient has pulse or is breathing): Full Support    Disposition:  I expect patient to be discharged Home.    Silas Arzate MD

## 2023-08-10 NOTE — PLAN OF CARE
Goal Outcome Evaluation:  Plan of Care Reviewed With: patient        Progress: no change  Outcome Evaluation: Vitals stable. Wound vac in place and CDI. LUZMA drains x2 in place to right and left lower quadrants with continued dark red output. IV unasym continued. Spouse at bedside - supportive.

## 2023-08-10 NOTE — CASE MANAGEMENT/SOCIAL WORK
Continued Stay Note  [facility]     Patient Name: Palmira Ruelas  MRN: [id number]  Today's Date: 8/10/2023    Admit Date: 8/8/2023    Plan: Home with spouse and [facility] HH for wound vac dressing changes twice weekly. Wound vac through [facility] (ordered through Wound center. Family to transport   Discharge Plan       Row Name 08/10/23 1119       Plan    Plan Home with spouse and [facility] HH for wound vac dressing changes twice weekly. Wound vac through [facility] (ordered through Wound center. Family to transport    Patient/Family in Agreement with Plan yes    Plan Comments Called and spoke to [facility] ([phone]) and they can accept pt. Faxed HH and wound vac orders, D/C summary, and last wound care note from [facility] to [facility]. Pt will see Dr Arzate 8/15 for 1st dressing change then [facility] program will see 8/18 Fri for wound vac dressing change then twice weekly. Pt has home wound vac through [facility] in room. Wound vac was ordered through W[facility] Care Center. Notified Dany/BG to cancel I wound Vac. Ed Gee RN-BC                  Discharge Codes    No documentation.                 Expected Discharge Date and Time       Expected Discharge Date Expected Discharge Time    Aug 10, 2023               Ed Gee RN

## 2023-08-10 NOTE — DISCHARGE SUMMARY
Date of Discharge:  8/10/2023    Discharge Diagnosis: Abdominal wall cellulitis, open wound.     Presenting Problem/History of Present Illness  Active Hospital Problems    Diagnosis  POA    **Cellulitis of abdominal wall [L03.311]  Yes      Resolved Hospital Problems   No resolved problems to display.        Abdominal wall cellulitis resolved.   Hospital Course  Patient is a 28 y.o. female presented with history of abdominal lipectomy, s/p recent admission with abdominal open wound with cellulitis . The patient was treated with IV Unasyn and the cellulitis improved . A wound vacuum was placed over the lower abdominal area to improve the healing over the region. .      Procedures Performed         Consults:   Consults       No orders found from 7/10/2023 to 8/9/2023.            Pertinent Test Results: labs:      Condition on Discharge:  Stable      Vital Signs  Temp:  [98.8 øF (37.1 øC)-99.5 øF (37.5 øC)] 98.8 øF (37.1 øC)  Heart Rate:  [] 102  Resp:  [16-18] 18  BP: ()/(66-79) 119/79    Physical Exam:     General Appearance:    Alert, cooperative, in no acute distress   Head:    Normocephalic, without obvious abnormality, atraumatic   Eyes:            Lids and lashes normal, conjunctivae and sclerae normal, no   icterus, no pallor, corneas clear, PERRLA   Ears:    Ears appear intact with no abnormalities noted   Throat:   No oral lesions, no thrush, oral mucosa moist   Neck:   No adenopathy, supple, trachea midline, no thyromegaly, no     carotid bruit, no JVD       Lungs:     Unlabored breathing.    Heart:    Regular rhythm and normal rate.    Breast Exam:    Deferred   Abdomen:     Normal bowel sounds, no masses, no organomegaly, soft       , non-distended, no guarding, no rebound                 tenderness  Abdominal open wound over the T zone, deep tissue viable, no foul odor or purulence.   Umbilical area: weeping with defect in the inferior aspect of the umbilicus.    Genitalia:    Deferred    Extremities:   Moves all extremities well, no edema, no cyanosis, no              redness   Pulses:   Pulses palpable and equal bilaterally   Skin:   No bleeding, bruising or rash   Lymph nodes:   No palpable adenopathy   Neurologic:   Cranial nerves 2 - 12 grossly intact, sensation intact, DTR        present and equal bilaterally       Discharge Disposition  Home or Self Care    Discharge Medications     Discharge Medications        New Medications        Instructions Start Date   docusate sodium 100 MG capsule   100 mg, Oral, 2 Times Daily      doxycycline 100 MG capsule  Commonly known as: VIBRAMYCIN   100 mg, Oral, 2 Times Daily      HYDROcodone-acetaminophen 7.5-325 MG per tablet  Commonly known as: NORCO   1 tablet, Oral, Every 6 Hours PRN             Continue These Medications        Instructions Start Date   cloNIDine 0.1 MG tablet  Commonly known as: CATAPRES   0.1 mg, Oral, Nightly      hydroCHLOROthiazide 12.5 MG tablet  Commonly known as: HYDRODIURIL   25 mg, Oral, Daily      hydrOXYzine 50 MG tablet  Commonly known as: ATARAX   100 mg, Oral, Nightly      metoprolol tartrate 25 MG tablet  Commonly known as: LOPRESSOR   12.5 mg, Oral, Daily      vilazodone 10 MG tablet tablet  Commonly known as: VIIBRYD   10 mg, Oral, Daily      WEGOVY SC   1.5 mg, Subcutaneous, Weekly, Takes on Tuesday               Discharge Diet:     Activity at Discharge:   Activity Instructions       Driving Restrictions      Type of Restriction: Driving    Driving Restrictions: No Driving    Lifting Restrictions      Type of Restriction: Lifting    Lifting Restrictions: Lifting Restriction (Indicate Limit)    Weight Limit (Pounds): 5    Length of Lifting Restriction: No lifting over 5 lbs.            Follow-up Appointments  Future Appointments   Date Time Provider Department Center   8/15/2023 10:00 AM Silas Arzate MD  [facility]     Additional Instructions for the Follow-ups that You Need to Schedule       Discharge  Follow-up with Specified Provider: Dr Arzate; 5 Days   As directed      To: Dr Arzate   Follow Up: 5 Days   Follow Up Details: [facility].                Test Results Pending at Discharge       Silas Arzate MD  08/10/23  09:31 EDT

## 2023-08-10 NOTE — PAYOR COMM NOTE
"-Palmira Moreno (28 y.o. Female)          DC SUMMARY FOR [id number]    CONTACT PHOEBE ROMANO  P# [phone]  F# [phone]         Date of Birth   1995    Social Security Number       Address   [address] [place] [address]    Home Phone   [phone]    MRN   [id number]       Cheondoism   Unknown    Marital Status                               Admission Date   23    Admission Type   Urgent    Admitting Provider   Silas Arzate MD    Attending Provider   Silas Arzate MD    Department, Room/Bed   [address], E467/1       Discharge Date       Discharge Disposition   Home or Self Care    Discharge Destination                                 Attending Provider: Silas Arzate MD    Allergies: Betadine [Povidone Iodine], Latex    Isolation: None   Infection: None   Code Status: CPR    Ht: 154.9 cm (61\")   Wt: 70 kg (154 lb 5.2 oz)    Admission Cmt: None   Principal Problem: Cellulitis of abdominal wall [L03.311]                   Active Insurance as of 2023       Primary Coverage       Payor Plan Insurance Group Employer/Plan Group    [employer]HE[employer] BLUE CROSS [employer] BLUE [employer]OSS BLUE [employer] PPO [id number]       Payor Plan Address Payor Plan Phone Number Payor Plan Fax Number Effective Dates    [address] [phone]  10/21/2020 - None Entered    [place] [address]         Subscriber Name Subscriber Birth Date Member ID       NICOLASA MORENO 1995 [id number]                     Emergency Contacts        (Rel.) Home Phone Work Phone Mobile Phone    NICOLASA Moreno (Spouse) [phone] -- [phone]                 Physician Progress Notes (last 24 hours)        Silas Arzate MD at 08/10/23 0913           [facility]     Progress Note    Patient Name: Palmira Moreno  : 1995  MRN: [id number]  Primary Care Physician:  No primary care provider on file.  Date of admission: 2023    Subjective   Subjective "     Chief Complaint: No new issues today.    History of Present Illness  28 year old s/p abdominal lipectomy with wound vacuum over the lower abdominal area. The vacuum is working well with bloody output in the canister.  She is on Norco 7.5/325 mg for her pain control.   Patient Reports She was able to eat breakfast and pain is under good control.     Review of Systems    Objective   Objective     Vitals:   Temp:  [98.8 øF (37.1 øC)-99.5 øF (37.5 øC)] 98.8 øF (37.1 øC)  Heart Rate:  [] 102  Resp:  [16-18] 18  BP: ()/(66-79) 119/79    Physical Exam  Vitals reviewed.   Constitutional:       Appearance: Normal appearance.   HENT:      Head: Normocephalic and atraumatic.      Mouth/Throat:      Mouth: Mucous membranes are moist.   Eyes:      Pupils: Pupils are equal, round, and reactive to light.   Cardiovascular:      Rate and Rhythm: Normal rate.   Pulmonary:      Effort: Pulmonary effort is normal.   Abdominal:      General: Abdomen is flat.      Palpations: Abdomen is soft.   Skin:     General: Skin is warm and dry.      Capillary Refill: Capillary refill takes 2 to 3 seconds.      Comments: Abdominal area: abdominal wound over the lower T zone, the skin is non erythematous with mild fibrin . Edema is minimal .  Umbilical region with floor of the umbilicus viable and small defect over the inferior border.     No warmth to the skin.  No foul odor.    Neurological:      General: No focal deficit present.      Mental Status: She is alert.   Psychiatric:         Mood and Affect: Mood normal.         Behavior: Behavior normal.         Thought Content: Thought content normal.         Judgment: Judgment normal.        Result Review    Result Review:  I have personally reviewed the results from the time of this admission to 8/10/2023 09:13 EDT and agree with these findings:  [x]  Laboratory list / accordion  [x]  Microbiology  []  Radiology  []  EKG/Telemetry   []  Cardiology/Vascular   []  Pathology  []  Old  records  []  Other:  Most notable findings include:       Assessment & Plan   Assessment / Plan     Brief Patient Summary:      Active Hospital Problems:  Active Hospital Problems    Diagnosis     **Cellulitis of abdominal wall      Plan: The patient's cellulitis has resolved on IV Unasyn and she may be discharged home today on oral Doxycycline.  The patient will have a wound vacuum over the abdominal area, this will be changed on Tuesday in the wound care clinic.  The right sided LUZMA bulb was removed due to leaking over the edges of the tubing.  The left sided LUZMA is intact with bloody drainage.       DVT prophylaxis:  Mechanical DVT prophylaxis orders are present.    CODE STATUS:    Level Of Support Discussed With: Patient  Code Status (Patient has no pulse and is not breathing): CPR (Attempt to Resuscitate)  Medical Interventions (Patient has pulse or is breathing): Full Support    Disposition:  I expect patient to be discharged Home.    Silas Arzate MD               Electronically signed by Silas Arzate MD at 08/10/23 0962       Silas Arzate MD at 23 1620           [facility]     Progress Note    Patient Name: Palmira Ruelas  : 1995  MRN: [id number]  Primary Care Physician:  No primary care provider on file.  Date of admission: 2023    Subjective   Subjective     Chief Complaint: Right LUZMA drain discomfort and some discomfort over the midline.     History of Present Illness  28 year old female s/p abdominal lipectomy with open wound, s/p debridement with vacuum placement.   The vacuum is in tact and working well.   Patient Reports She is tolerating her oral intact.     Review of Systems    Objective   Objective     Vitals:   Temp:  [98.4 øF (36.9 øC)-99.1 øF (37.3 øC)] 99 øF (37.2 øC)  Heart Rate:  [82-95] 92  Resp:  [16] 16  BP: ()/(66-78) 96/66    Physical Exam  Vitals reviewed.   Constitutional:       Appearance: Normal appearance.   HENT:      Head:  Normocephalic and atraumatic.      Mouth/Throat:      Mouth: Mucous membranes are moist.   Eyes:      Pupils: Pupils are equal, round, and reactive to light.   Cardiovascular:      Rate and Rhythm: Normal rate and regular rhythm.   Pulmonary:      Effort: Pulmonary effort is normal.   Abdominal:      General: Abdomen is flat.      Palpations: Abdomen is soft.   Skin:     General: Skin is warm and dry.      Comments: Vacuum intact, some blood in the canister .      Right and left LUZMA bulb is intact with bloody output.     Abdominal skin op site with well opposed skin edges, no sign of any purulence, edema is less.      Tender over the LUZMA exit sites.        Neurological:      General: No focal deficit present.      Mental Status: She is alert.   Psychiatric:         Mood and Affect: Mood normal.         Behavior: Behavior normal.         Thought Content: Thought content normal.        Result Review    Result Review:  I have personally reviewed the results from the time of this admission to 8/9/2023 16:26 EDT and agree with these findings:  [x]  Laboratory list / accordion  [x]  Microbiology  []  Radiology  []  EKG/Telemetry   []  Cardiology/Vascular   []  Pathology  []  Old records  []  Other:  Most notable findings include: None      Assessment & Plan   Assessment / Plan     Brief Patient Summary:    Active Hospital Problems:  Active Hospital Problems    Diagnosis     **Cellulitis of abdominal wall      Plan: The patients cultures are pending, she had some bacteria on her early results.  She is on Unasyn and has responded well to this . She is on Norco pain medication and says she needs it for the LUZMA bulb pain and midline pain .  Plan to monitor her response to current therapy, no signs of sepsis or bleeding.       DVT prophylaxis:  Mechanical DVT prophylaxis orders are present.    CODE STATUS:    Level Of Support Discussed With: Patient  Code Status (Patient has no pulse and is not breathing): CPR (Attempt to  Resuscitate)  Medical Interventions (Patient has pulse or is breathing): Full Support    Disposition:  I expect patient to be discharged  in 24 hours. .    Silas Arzate MD               Electronically signed by Silas Arzate MD at 08/09/23 1631       Consult Notes (last 24 hours)  Notes from 08/09/23 1124 through 08/10/23 1124   No notes of this type exist for this encounter.          Discharge Summary        Silas Arzate MD at 08/10/23 0930            Date of Discharge:  8/10/2023    Discharge Diagnosis: Abdominal wall cellulitis, open wound.     Presenting Problem/History of Present Illness  Active Hospital Problems    Diagnosis  POA    **Cellulitis of abdominal wall [L03.311]  Yes      Resolved Hospital Problems   No resolved problems to display.        Abdominal wall cellulitis resolved.   Hospital Course  Patient is a 28 y.o. female presented with history of abdominal lipectomy, s/p recent admission with abdominal open wound with cellulitis . The patient was treated with IV Unasyn and the cellulitis improved . A wound vacuum was placed over the lower abdominal area to improve the healing over the region. .      Procedures Performed         Consults:   Consults       No orders found from 7/10/2023 to 8/9/2023.            Pertinent Test Results: labs:      Condition on Discharge:  Stable      Vital Signs  Temp:  [98.8 øF (37.1 øC)-99.5 øF (37.5 øC)] 98.8 øF (37.1 øC)  Heart Rate:  [] 102  Resp:  [16-18] 18  BP: ()/(66-79) 119/79    Physical Exam:     General Appearance:    Alert, cooperative, in no acute distress   Head:    Normocephalic, without obvious abnormality, atraumatic   Eyes:            Lids and lashes normal, conjunctivae and sclerae normal, no   icterus, no pallor, corneas clear, PERRLA   Ears:    Ears appear intact with no abnormalities noted   Throat:   No oral lesions, no thrush, oral mucosa moist   Neck:   No adenopathy, supple, trachea midline, no  thyromegaly, no     carotid bruit, no JVD       Lungs:     Unlabored breathing.    Heart:    Regular rhythm and normal rate.    Breast Exam:    Deferred   Abdomen:     Normal bowel sounds, no masses, no organomegaly, soft       , non-distended, no guarding, no rebound                 tenderness  Abdominal open wound over the T zone, deep tissue viable, no foul odor or purulence.   Umbilical area: weeping with defect in the inferior aspect of the umbilicus.    Genitalia:    Deferred   Extremities:   Moves all extremities well, no edema, no cyanosis, no              redness   Pulses:   Pulses palpable and equal bilaterally   Skin:   No bleeding, bruising or rash   Lymph nodes:   No palpable adenopathy   Neurologic:   Cranial nerves 2 - 12 grossly intact, sensation intact, DTR        present and equal bilaterally       Discharge Disposition  Home or Self Care    Discharge Medications     Discharge Medications        New Medications        Instructions Start Date   docusate sodium 100 MG capsule   100 mg, Oral, 2 Times Daily      doxycycline 100 MG capsule  Commonly known as: VIBRAMYCIN   100 mg, Oral, 2 Times Daily      HYDROcodone-acetaminophen 7.5-325 MG per tablet  Commonly known as: NORCO   1 tablet, Oral, Every 6 Hours PRN             Continue These Medications        Instructions Start Date   cloNIDine 0.1 MG tablet  Commonly known as: CATAPRES   0.1 mg, Oral, Nightly      hydroCHLOROthiazide 12.5 MG tablet  Commonly known as: HYDRODIURIL   25 mg, Oral, Daily      hydrOXYzine 50 MG tablet  Commonly known as: ATARAX   100 mg, Oral, Nightly      metoprolol tartrate 25 MG tablet  Commonly known as: LOPRESSOR   12.5 mg, Oral, Daily      vilazodone 10 MG tablet tablet  Commonly known as: VIIBRYD   10 mg, Oral, Daily      WEGOVY SC   1.5 mg, Subcutaneous, Weekly, Takes on Tuesday               Discharge Diet:     Activity at Discharge:   Activity Instructions       Driving Restrictions      Type of Restriction: Driving     Driving Restrictions: No Driving    Lifting Restrictions      Type of Restriction: Lifting    Lifting Restrictions: Lifting Restriction (Indicate Limit)    Weight Limit (Pounds): 5    Length of Lifting Restriction: No lifting over 5 lbs.            Follow-up Appointments  Future Appointments   Date Time Provider Department Center   8/15/2023 10:00 AM Silas Arzate MD [facility]     Additional Instructions for the Follow-ups that You Need to Schedule       Discharge Follow-up with Specified Provider: Dr Arzate; 5 Days   As directed      To: Dr Arzate   Follow Up: 5 Days   Follow Up Details: [facility].                Test Results Pending at Discharge       Silas Arzate MD  08/10/23  09:31 EDT      Electronically signed by Silas Arzate MD at 08/10/23 0936         Dianna Escobedo RN, CWOCN   Registered Nurse  Wound Care     Nursing Note      Signed     Date of Service: 08/10/23 0923  Creation Time: 08/10/23 0923     Signed         CWOCN- assisted MD with wound vacuum dressing change. Patient tolerated with some tenderness even with pain med. She will be going home today and follow up with MD on Tuesday. Provided instruction on calling [facility] [phone] for issues. I showed her how to change the canister. MD also provided home care instruction related to sealing the wound bed is suction is disrupted.. Patient verbalized understanding.        08/10/23 0920   Wound 08/08/23 1056 abdomen Incision   Placement Date/Time: 08/08/23 1056   Present on Hospital Admission: Yes  Location: abdomen  Primary Wound Type: Incision   Dressing Appearance intact   Base moist;red;pink;necrotic;slough   Black (%), Wound Tissue Color 50   Red (%), Wound Tissue Color 50   Periwound intact   Periwound Temperature warm   Periwound Skin Turgor soft   Edges irregular   Drainage Characteristics/Odor creamy;serosanguineous   Drainage Amount small   Care, Wound cleansed with;sterile normal  saline;negative pressure wound therapy   Dressing Care dressing changed   Periwound Care hydrocolloid barrier applied   Wound Output (mL) 100   NPWT (Negative Pressure Wound Therapy) 08/08/23 1320 abdomen   Placement Date/Time: 08/08/23 1320   Location: abdomen   Therapy Setting continuous therapy  (placed to home machine)   Dressing foam, black;transparent dressing   Pressure Setting 125 mmHg   Sponges Inserted    (1 piece in middle tunnel of midline wound, 1 piece in superior depth area of midline wound. 1 piece along the midline incision and 1 piece along the transverse incision)   Sponges Removed 1

## 2023-08-10 NOTE — CASE MANAGEMENT/SOCIAL WORK
Case Management Discharge Note      Final Note: Home with [facility] and [facility]  for wound vac dressing changes twice weekly. Wound vac through [facility] (ordered through Wound center. Family to transport         Selected Continued Care - Admitted Since 8/8/2023       Destination    No services have been selected for the patient.                Durable Medical Equipment    No services have been selected for the patient.                Dialysis/Infusion    No services have been selected for the patient.                Home Medical Care Coordination complete.      Service Provider Selected Services Address Phone Fax Patient [facility]    [facility] [address] [facility] [address] [phone] --              Therapy    No services have been selected for the patient.                Community Resources    No services have been selected for the patient.                Community & DME    No services have been selected for the patient.                    Transportation Services  Private: Car    Final Discharge Disposition Code: 06 - home with home health care

## 2023-08-10 NOTE — NURSING NOTE
DACIA- assisted MD with wound vacuum dressing change. Patient tolerated with some tenderness even with pain med. She will be going home today and follow up with MD on Tuesday. Provided instruction on calling [facility] [phone] for issues. I showed her how to change the canister. MD also provided home care instruction related to sealing the wound bed is suction is disrupted.. Patient verbalized understanding.      08/10/23 0920   Wound 08/08/23 1056 abdomen Incision   Placement Date/Time: 08/08/23 1056   Present on Hospital Admission: Yes  Location: abdomen  Primary Wound Type: Incision   Dressing Appearance intact   Base moist;red;pink;necrotic;slough   Black (%), Wound Tissue Color 50   Red (%), Wound Tissue Color 50   Periwound intact   Periwound Temperature warm   Periwound Skin Turgor soft   Edges irregular   Drainage Characteristics/Odor creamy;serosanguineous   Drainage Amount small   Care, Wound cleansed with;sterile normal saline;negative pressure wound therapy   Dressing Care dressing changed   Periwound Care hydrocolloid barrier applied   Wound Output (mL) 100   NPWT (Negative Pressure Wound Therapy) 08/08/23 1320 abdomen   Placement Date/Time: 08/08/23 1320   Location: abdomen   Therapy Setting continuous therapy  (placed to home machine)   Dressing foam, black;transparent dressing   Pressure Setting 125 mmHg   Sponges Inserted   (1 piece in middle tunnel of midline wound, 1 piece in superior depth area of midline wound. 1 piece along the midline incision and 1 piece along the transverse incision)   Sponges Removed 1

## 2023-08-11 LAB
BACTERIA SPEC AEROBE CULT: ABNORMAL
BACTERIA SPEC AEROBE CULT: ABNORMAL
GRAM STN SPEC: ABNORMAL

## 2023-08-13 LAB — BACTERIA SPEC ANAEROBE CULT: ABNORMAL

## 2023-08-15 ENCOUNTER — OFFICE VISIT (OUTPATIENT)
Dept: WOUND CARE | Facility: HOSPITAL | Age: 28
End: 2023-08-15
Payer: COMMERCIAL

## 2023-08-15 DIAGNOSIS — L03.311 CELLULITIS OF ABDOMINAL WALL: ICD-10-CM

## 2023-08-15 PROCEDURE — 97605 NEG PRS WND THER DME<=50SQCM: CPT

## 2023-08-15 RX ORDER — HYDROCODONE BITARTRATE AND ACETAMINOPHEN 7.5; 325 MG/1; MG/1
1 TABLET ORAL EVERY 6 HOURS PRN
Qty: 20 TABLET | Refills: 0 | Status: SHIPPED | OUTPATIENT
Start: 2023-08-15 | End: 2023-08-20

## 2023-08-22 ENCOUNTER — OFFICE VISIT (OUTPATIENT)
Dept: WOUND CARE | Facility: HOSPITAL | Age: 28
End: 2023-08-22
Payer: COMMERCIAL

## 2023-08-22 PROCEDURE — 97605 NEG PRS WND THER DME<=50SQCM: CPT

## 2023-08-29 ENCOUNTER — OFFICE VISIT (OUTPATIENT)
Dept: WOUND CARE | Facility: HOSPITAL | Age: 28
End: 2023-08-29
Payer: COMMERCIAL

## 2023-08-29 PROCEDURE — 97605 NEG PRS WND THER DME<=50SQCM: CPT

## 2023-08-29 RX ORDER — DOXYCYCLINE HYCLATE 100 MG/1
100 CAPSULE ORAL 2 TIMES DAILY
Qty: 30 CAPSULE | Refills: 1 | Status: SHIPPED | OUTPATIENT
Start: 2023-08-29

## 2023-09-05 ENCOUNTER — OFFICE VISIT (OUTPATIENT)
Dept: WOUND CARE | Facility: HOSPITAL | Age: 28
End: 2023-09-05
Payer: COMMERCIAL

## 2023-09-05 PROCEDURE — 97605 NEG PRS WND THER DME<=50SQCM: CPT

## 2023-09-12 ENCOUNTER — OFFICE VISIT (OUTPATIENT)
Dept: WOUND CARE | Facility: HOSPITAL | Age: 28
End: 2023-09-12
Payer: COMMERCIAL

## 2023-09-12 PROCEDURE — 97605 NEG PRS WND THER DME<=50SQCM: CPT

## 2023-09-19 ENCOUNTER — OFFICE VISIT (OUTPATIENT)
Dept: WOUND CARE | Facility: HOSPITAL | Age: 28
End: 2023-09-19
Payer: COMMERCIAL

## 2023-09-19 PROCEDURE — 97605 NEG PRS WND THER DME<=50SQCM: CPT

## 2023-09-26 ENCOUNTER — OFFICE VISIT (OUTPATIENT)
Dept: WOUND CARE | Facility: HOSPITAL | Age: 28
End: 2023-09-26
Payer: COMMERCIAL

## 2023-10-03 ENCOUNTER — OFFICE VISIT (OUTPATIENT)
Dept: WOUND CARE | Facility: HOSPITAL | Age: 28
End: 2023-10-03
Payer: COMMERCIAL

## 2023-10-03 PROCEDURE — 97607 NEG PRS WND THR NDME<=50SQCM: CPT

## 2023-10-03 RX ORDER — FLUCONAZOLE 100 MG/1
100 TABLET ORAL DAILY
Qty: 3 TABLET | Refills: 0 | Status: SHIPPED | OUTPATIENT
Start: 2023-10-03 | End: 2023-10-06

## 2023-10-17 ENCOUNTER — OFFICE VISIT (OUTPATIENT)
Dept: WOUND CARE | Facility: HOSPITAL | Age: 28
End: 2023-10-17
Payer: COMMERCIAL

## 2023-10-17 PROCEDURE — G0463 HOSPITAL OUTPT CLINIC VISIT: HCPCS
